# Patient Record
Sex: FEMALE | Race: WHITE | Employment: FULL TIME | URBAN - METROPOLITAN AREA
[De-identification: names, ages, dates, MRNs, and addresses within clinical notes are randomized per-mention and may not be internally consistent; named-entity substitution may affect disease eponyms.]

---

## 2018-12-17 ENCOUNTER — HOSPITAL ENCOUNTER (EMERGENCY)
Age: 33
Discharge: HOME | End: 2018-12-17
Attending: EMERGENCY MEDICINE
Payer: OTHER GOVERNMENT

## 2018-12-17 ENCOUNTER — APPOINTMENT (OUTPATIENT)
Dept: GENERAL RADIOLOGY | Age: 33
End: 2018-12-17
Attending: PHYSICIAN ASSISTANT
Payer: OTHER GOVERNMENT

## 2018-12-17 VITALS
HEART RATE: 82 BPM | HEIGHT: 69 IN | RESPIRATION RATE: 18 BRPM | SYSTOLIC BLOOD PRESSURE: 155 MMHG | WEIGHT: 158 LBS | OXYGEN SATURATION: 99 % | BODY MASS INDEX: 23.4 KG/M2 | DIASTOLIC BLOOD PRESSURE: 79 MMHG | TEMPERATURE: 98.5 F

## 2018-12-17 DIAGNOSIS — M06.9 RHEUMATOID ARTHRITIS, INVOLVING UNSPECIFIED SITE, UNSPECIFIED RHEUMATOID FACTOR PRESENCE: ICD-10-CM

## 2018-12-17 DIAGNOSIS — M54.42 ACUTE MIDLINE LOW BACK PAIN WITH LEFT-SIDED SCIATICA: ICD-10-CM

## 2018-12-17 DIAGNOSIS — M54.32 SCIATICA, LEFT SIDE: Primary | ICD-10-CM

## 2018-12-17 PROCEDURE — 99282 EMERGENCY DEPT VISIT SF MDM: CPT

## 2018-12-17 PROCEDURE — 72100 X-RAY EXAM L-S SPINE 2/3 VWS: CPT

## 2018-12-17 RX ORDER — MELOXICAM 15 MG/1
15 TABLET ORAL DAILY
Qty: 20 TAB | Refills: 0 | Status: SHIPPED | OUTPATIENT
Start: 2018-12-17 | End: 2019-09-22

## 2018-12-17 RX ORDER — PREDNISONE 20 MG/1
60 TABLET ORAL
Qty: 18 TAB | Refills: 0 | Status: SHIPPED | OUTPATIENT
Start: 2018-12-17 | End: 2018-12-23

## 2018-12-18 NOTE — ED NOTES
Patient (s)  given copy of dc instructions and 2 script(s). Patient(s)  verbalized understanding of instructions and script (s). Patient given a current medication reconciliation form and verbalized understanding of their medications. Patient (s) verbalized understanding of the importance of discussing medications with  his or her physician or clinic when they follow up. Patient alert and oriented and in no acute distress. Pt verbalizes pain scale of 0 out of 10. Patient discharged home ambulatory with family.

## 2018-12-18 NOTE — ED PROVIDER NOTES
EMERGENCY DEPARTMENT HISTORY AND PHYSICAL EXAM      Date: 12/17/2018  Patient Name: Joanna Bolden    History of Presenting Illness     Chief Complaint   Patient presents with    Back Pain       History Provided By: Patient    HPI: Joanna Bolden, 29 y.o. female with PMHx significant for rheumatoid arthritis, presents ambulatory to the ED with cc of lower back pain x 2-3 weeks which radiates into her L glute. Pt states that she was recently taken off of her biologics because she wants to have a baby next year and she has to be free of these medications for about a year before she can attempt pregnancy. Pt has been taking tylenol with no relief. She has been ambulatory. Pain worsens when she is sitting. Pt without complaint of saddle anesthesia or altered sensation when wiping in the perineal/perianal area. Pt denies any episodes of urinary/fecal incontinence. There is no complaint of blood in the urine or stool. Pt denies a ripping or tearing sensation in the abdomen. Pt denies traumatic injury to the back. Pt denies weight loss, pain worse when supine, or night sweats. Pt is able to ambulate without assistance. PCP: No primary care provider on file. There are no other complaints, changes, or physical findings at this time. Past History     Past Medical History:  No past medical history on file. Past Surgical History:  No past surgical history on file. Family History:  No family history on file. Social History:  Social History     Tobacco Use    Smoking status: Not on file   Substance Use Topics    Alcohol use: Not on file    Drug use: Not on file       Allergies:  No Known Allergies      Review of Systems   Review of Systems   Constitutional: Negative. Negative for activity change, appetite change, chills and fever. Respiratory: Negative for cough, shortness of breath and wheezing. Cardiovascular: Negative for chest pain, palpitations and leg swelling.    Gastrointestinal: Negative for diarrhea, nausea and vomiting. Genitourinary: Negative for dysuria and hematuria. Musculoskeletal: Positive for back pain. Negative for arthralgias and myalgias. Skin: Negative for color change, rash and wound. Neurological: Negative for dizziness and headaches. All other systems reviewed and are negative. Physical Exam   Physical Exam   Constitutional: She is oriented to person, place, and time. She appears well-developed and well-nourished. No distress. 29 y.o.  female in NAD  Communicates appropriately and in full sentences   HENT:   Head: Normocephalic and atraumatic. Eyes: Conjunctivae are normal. Pupils are equal, round, and reactive to light. Right eye exhibits no discharge. Left eye exhibits no discharge. Neck: Normal range of motion. Neck supple. No nuchal rigidity or meningeal signs   Pulmonary/Chest: Effort normal. No respiratory distress. Musculoskeletal: Normal range of motion. She exhibits no deformity. No neurologic, motor, vascular, or compartment embarrassment observed on exam. No focal neurologic deficits. BACK: Normal spinal curvatures. No step off or deformity. NT to palpation along midline. Positive seated SLR of L leg. Flexion/extension movement's at pt's baseline. Ambulatory without difficulty. Neurological: She is alert and oriented to person, place, and time. No focal neuro deficits. NVI. Neurologically intact of UE and LE B/L  Sensation intact and symmetrical of UE and LE B/L. Strength 5/5 of UE B/L, Strength 5/5 of LE B/L. Skin: Skin is warm and dry. No rash noted. She is not diaphoretic. No erythema. No pallor. Psychiatric: She has a normal mood and affect. Her behavior is normal.   Nursing note and vitals reviewed. Diagnostic Study Results     Radiologic Studies -   XR SPINE LUMB 2 OR 3 V   Final Result   IMPRESSION:  Normal lumbar spine.                       Medical Decision Making   I am the first provider for this patient. I reviewed the vital signs, available nursing notes, past medical history, past surgical history, family history and social history. Vital Signs-Reviewed the patient's vital signs. Patient Vitals for the past 12 hrs:   Temp Pulse Resp BP SpO2   12/17/18 2051 98.5 °F (36.9 °C) 82 18 155/79 99 %         Records Reviewed: Nursing Notes and Old Medical Records    Provider Notes (Medical Decision Making):   DDx: RA, sciatica, sprain, strain, spasm. Pt requests XR while here. Will treat symptomatically and urge close PCP and orthopedics follow-up    ED Course:   Initial assessment performed. The patients presenting problems have been discussed, and they are in agreement with the care plan formulated and outlined with them. I have encouraged them to ask questions as they arise throughout their visit. DISCHARGE NOTE:  Hector Gutierrez  results have been reviewed with her. She has been counseled regarding her diagnosis. She verbally conveys understanding and agreement of the signs, symptoms, diagnosis, treatment and prognosis and additionally agrees to follow up as recommended with Dr. Gage primary care provider on file. in 24 - 48 hours. She also agrees with the care-plan and conveys that all of her questions have been answered. I have also put together some discharge instructions for her that include: 1) educational information regarding their diagnosis, 2) how to care for their diagnosis at home, as well a 3) list of reasons why they would want to return to the ED prior to their follow-up appointment, should their condition change. She and/or family's questions have been answered. I have encouraged them to see the official results in [facility]\" or to retrieve the specifics of their results from medical records. PLAN:  1. Return precautions as discussed  2. Follow-up with providers as directed  3. Medications as prescribed    Return to ED if worse     Diagnosis     Clinical Impression:   1. Sciatica, left side    2. Rheumatoid arthritis, involving unspecified site, unspecified rheumatoid factor presence ([facility] [address]    3. Acute midline low back pain with left-sided sciatica        Discharge Medication List as of 12/17/2018 10:03 PM      START taking these medications    Details   predniSONE (DELTASONE) 20 mg tablet Take 60 mg by mouth daily (with breakfast) for 6 days. , Print, Disp-18 Tab, R-0      meloxicam (MOBIC) 15 mg tablet Take 1 Tab by mouth daily. , Print, Disp-20 Tab, R-0             Follow-up Information     Follow up With Specialties Details Why Contact Info    [facility] EMERGENCY DEPT Emergency Medicine Go to If symptoms worsen, As needed [address]    Luke Perez MD Orthopedic Surgery Call today  [address] [place] [address]  [phone]                This note will not be viewable in [address]

## 2018-12-18 NOTE — DISCHARGE INSTRUCTIONS
Learning About Relief for Back Pain  What is back tension and strain? Back strain happens when you overstretch, or pull, a muscle in your back. You may hurt your back in an accident or when you exercise or lift something. Most back pain will get better with rest and time. You can take care of yourself at home to help your back heal.  What can you do first to relieve back pain? When you first feel back pain, try these steps:  · Walk. Take a short walk (10 to 20 minutes) on a level surface (no slopes, hills, or stairs) every 2 to 3 hours. Walk only distances you can manage without pain, especially leg pain. · Relax. Find a comfortable position for rest. Some people are comfortable on the floor or a medium-firm bed with a small pillow under their head and another under their knees. Some people prefer to lie on their side with a pillow between their knees. Don't stay in one position for too long. · Try heat or ice. Try using a heating pad on a low or medium setting, or take a warm shower, for 15 to 20 minutes every 2 to 3 hours. Or you can buy single-use heat wraps that last up to 8 hours. You can also try an ice pack for 10 to 15 minutes every 2 to 3 hours. You can use an ice pack or a bag of frozen vegetables wrapped in a thin towel. There is not strong evidence that either heat or ice will help, but you can try them to see if they help. You may also want to try switching between heat and cold. · Take pain medicine exactly as directed. ¨ If the doctor gave you a prescription medicine for pain, take it as prescribed. ¨ If you are not taking a prescription pain medicine, ask your doctor if you can take an over-the-counter medicine. What else can you do? · Stretch and exercise. Exercises that increase flexibility may relieve your pain and make it easier for your muscles to keep your spine in a good, neutral position. And don't forget to keep walking. · Do self-massage.  You can use self-massage to unwind after work or school or to energize yourself in the morning. You can easily massage your feet, hands, or neck. Self-massage works best if you are in comfortable clothes and are sitting or lying in a comfortable position. Use oil or lotion to massage bare skin. · Reduce stress. Back pain can lead to a vicious [place]: Distress about the pain tenses the muscles in your back, which in turn causes more pain. Learn how to relax your mind and your muscles to lower your stress. Where can you learn more? Go to http://hanna-royce.info/. Enter A747 in the search box to learn more about \"Learning About Relief for Back Pain. \"  Current as of: March 21, 2017  Content Version: 11.5  © 4305-1865 [employer]. Care instructions adapted under license by [employer] (which disclaims liability or warranty for this information). If you have questions about a medical condition or this instruction, always ask your healthcare professional. [place] [address] any warranty or liability for your use of this information. Back Pain: Care Instructions  Your Care Instructions    Back pain has many possible causes. It is often related to problems with muscles and ligaments of the back. It may also be related to problems with the nerves, discs, or bones of the back. Moving, lifting, standing, sitting, or sleeping in an awkward way can strain the back. Sometimes you don't notice the injury until later. Arthritis is another common cause of back pain. Although it may hurt a lot, back pain usually improves on its own within several weeks. Most people recover in 12 weeks or less. Using good home treatment and being careful not to stress your back can help you feel better sooner. Follow-up care is a key part of your treatment and safety. Be sure to make and go to all appointments, and call your doctor if you are having problems.  It's also a good idea to know your test results and keep a list of the medicines you take. How can you care for yourself at home? · Sit or lie in positions that are most comfortable and reduce your pain. Try one of these positions when you lie down:  ? Lie on your back with your knees bent and supported by large pillows. ? Lie on the floor with your legs on the seat of a sofa or chair. ? Lie on your side with your knees and hips bent and a pillow between your legs. ? Lie on your stomach if it does not make pain worse. · Do not sit up in bed, and avoid soft couches and twisted positions. Bed rest can help relieve pain at first, but it delays healing. Avoid bed rest after the first day of back pain. · Change positions every 30 minutes. If you must sit for long periods of time, take breaks from sitting. Get up and walk around, or lie in a comfortable position. · Try using a heating pad on a low or medium setting for 15 to 20 minutes every 2 or 3 hours. Try a warm shower in place of one session with the heating pad. · You can also try an ice pack for 10 to 15 minutes every 2 to 3 hours. Put a thin cloth between the ice pack and your skin. · Take pain medicines exactly as directed. ? If the doctor gave you a prescription medicine for pain, take it as prescribed. ? If you are not taking a prescription pain medicine, ask your doctor if you can take an over-the-counter medicine. · Take short walks several times a day. You can start with 5 to 10 minutes, 3 or 4 times a day, and work up to longer walks. Walk on level surfaces and avoid hills and stairs until your back is better. · Return to work and other activities as soon as you can. Continued rest without activity is usually not good for your back. · To prevent future back pain, do exercises to stretch and strengthen your back and stomach. Learn how to use good posture, safe lifting techniques, and proper body mechanics. When should you call for help?   Call your doctor now or seek immediate medical care if:    · You have new or worsening numbness in your legs.     · You have new or worsening weakness in your legs. (This could make it hard to stand up.)     · You lose control of your bladder or bowels.    Watch closely for changes in your health, and be sure to contact your doctor if:    · You have a fever, lose weight, or don't feel well.     · You do not get better as expected. Where can you learn more? Go to http://hanna-royce.info/. Enter T334 in the search box to learn more about \"Back Pain: Care Instructions. \"  Current as of: November 29, 2017  Content Version: 11.8  © 2529-5281 [email or web address]. Care instructions adapted under license by [employer] (which disclaims liability or warranty for this information). If you have questions about a medical condition or this instruction, always ask your healthcare professional. [place] [address] any warranty or liability for your use of this information. Learning About Relief for Back Pain  What is back tension and strain? Back strain happens when you overstretch, or pull, a muscle in your back. You may hurt your back in an accident or when you exercise or lift something. Most back pain will get better with rest and time. You can take care of yourself at home to help your back heal.  What can you do first to relieve back pain? When you first feel back pain, try these steps:  · Walk. Take a short walk (10 to 20 minutes) on a level surface (no slopes, hills, or stairs) every 2 to 3 hours. Walk only distances you can manage without pain, especially leg pain. · Relax. Find a comfortable position for rest. Some people are comfortable on the floor or a medium-firm bed with a small pillow under their head and another under their knees. Some people prefer to lie on their side with a pillow between their knees. Don't stay in one position for too long. · Try heat or ice.  Try using a heating pad on a low or medium setting, or take a warm shower, for 15 to 20 minutes every 2 to 3 hours. Or you can buy single-use heat wraps that last up to 8 hours. You can also try an ice pack for 10 to 15 minutes every 2 to 3 hours. You can use an ice pack or a bag of frozen vegetables wrapped in a thin towel. There is not strong evidence that either heat or ice will help, but you can try them to see if they help. You may also want to try switching between heat and cold. · Take pain medicine exactly as directed. ? If the doctor gave you a prescription medicine for pain, take it as prescribed. ? If you are not taking a prescription pain medicine, ask your doctor if you can take an over-the-counter medicine. What else can you do? · Stretch and exercise. Exercises that increase flexibility may relieve your pain and make it easier for your muscles to keep your spine in a good, neutral position. And don't forget to keep walking. · Do self-massage. You can use self-massage to unwind after work or school or to energize yourself in the morning. You can easily massage your feet, hands, or neck. Self-massage works best if you are in comfortable clothes and are sitting or lying in a comfortable position. Use oil or lotion to massage bare skin. · Reduce stress. Back pain can lead to a vicious [place]: Distress about the pain tenses the muscles in your back, which in turn causes more pain. Learn how to relax your mind and your muscles to lower your stress. Where can you learn more? Go to http://hanna-royce.info/. Enter P273 in the search box to learn more about \"Learning About Relief for Back Pain. \"  Current as of: November 29, 2017  Content Version: 11.8  © 9171-7134 [employer]. Care instructions adapted under license by [employer] (which disclaims liability or warranty for this information).  If you have questions about a medical condition or this instruction, always ask your healthcare professional. [address] any warranty or liability for your use of this information. Sciatica: Exercises  Your Care Instructions  Here are some examples of typical rehabilitation exercises for your condition. Start each exercise slowly. Ease off the exercise if you start to have pain. Your doctor or physical therapist will tell you when you can start these exercises and which ones will work best for you. When you are not being active, find a comfortable position for rest. Some people are comfortable on the floor or a medium-firm bed with a small pillow under their head and another under their knees. Some people prefer to lie on their side with a pillow between their knees. Don't stay in one position for too long. Take short walks (10 to 20 minutes) every 2 to 3 hours. Avoid slopes, hills, and stairs until you feel better. Walk only distances you can manage without pain, especially leg pain. How to do the exercises  Back stretches    1. Get down on your hands and knees on the floor. 2. Relax your head and allow it to droop. Round your back up toward the ceiling until you feel a nice stretch in your upper, middle, and lower back. Hold this stretch for as long as it feels comfortable, or about 15 to 30 seconds. 3. Return to the starting position with a flat back while you are on your hands and knees. 4. Let your back sway by pressing your stomach toward the floor. Lift your buttocks toward the ceiling. 5. Hold this position for 15 to 30 seconds. 6. Repeat 2 to 4 times. Follow-up care is a key part of your treatment and safety. Be sure to make and go to all appointments, and call your doctor if you are having problems. It's also a good idea to know your test results and keep a list of the medicines you take. Where can you learn more? Go to http://hanna-royce.info/. Enter H675 in the search box to learn more about \"Sciatica: Exercises. \"  Current as of: November 29, 2017  Content Version: 11.8  © [id number] [employer]. Care instructions adapted under license by [employer] (which disclaims liability or warranty for this information). If you have questions about a medical condition or this instruction, always ask your healthcare professional. [address] any warranty or liability for your use of this information. Sciatica: Care Instructions  Your Care Instructions    Sciatica (say \"olz-UN-qv-kuh\") is an irritation of one of the sciatic nerves, which come from the spinal cord in the lower back. The sciatic nerves and their branches extend down through the buttock to the foot. Sciatica can develop when an injured disc in the back presses against a spinal nerve root. Its main symptom is pain, numbness, or weakness that is often worse in the leg or foot than in the back. Sciatica often will improve and go away with time. Early treatment usually includes medicines and exercises to relieve pain. Follow-up care is a key part of your treatment and safety. Be sure to make and go to all appointments, and call your doctor if you are having problems. It's also a good idea to know your test results and keep a list of the medicines you take. How can you care for yourself at home? · Take pain medicines exactly as directed. ? If the doctor gave you a prescription medicine for pain, take it as prescribed. ? If you are not taking a prescription pain medicine, ask your doctor if you can take an over-the-counter medicine. · Use heat or ice to relieve pain. ? To apply heat, put a warm water bottle, heating pad set on low, or warm cloth on your back. Do not go to sleep with a heating pad on your skin. ? To use ice, put ice or a cold pack on the area for 10 to 20 minutes at a time. Put a thin cloth between the ice and your skin. · Avoid sitting if possible, unless it feels better than standing.   · Alternate lying down with short walks. Increase your walking distance as you are able to without making your symptoms worse. · Do not do anything that makes your symptoms worse. When should you call for help? Call 911 anytime you think you may need emergency care. For example, call if:    · You are unable to move a leg at all.   [facility] your doctor now or seek immediate medical care if:    · You have new or worse symptoms in your legs or buttocks. Symptoms may include:  ? Numbness or tingling. ? Weakness. ? Pain.     · You lose bladder or bowel control.    Watch closely for changes in your health, and be sure to contact your doctor if:    · You are not getting better as expected. Where can you learn more? Go to http://hanna-royce.info/. Enter [phone] in the search box to learn more about \"Sciatica: Care Instructions. \"  Current as of: November 29, 2017  Content Version: 11.8  © 0124-4153 [employer]. Care instructions adapted under license by [employer] (which disclaims liability or warranty for this information). If you have questions about a medical condition or this instruction, always ask your healthcare professional. [place] [address] any warranty or liability for your use of this information.

## 2019-09-22 ENCOUNTER — HOSPITAL ENCOUNTER (EMERGENCY)
Age: 34
Discharge: HOME | End: 2019-09-22
Attending: EMERGENCY MEDICINE
Payer: OTHER GOVERNMENT

## 2019-09-22 VITALS
BODY MASS INDEX: 23.48 KG/M2 | DIASTOLIC BLOOD PRESSURE: 66 MMHG | RESPIRATION RATE: 18 BRPM | OXYGEN SATURATION: 98 % | HEART RATE: 64 BPM | WEIGHT: 159 LBS | TEMPERATURE: 97.6 F | SYSTOLIC BLOOD PRESSURE: 99 MMHG

## 2019-09-22 DIAGNOSIS — L03.032 PARONYCHIA OF FOURTH TOE OF LEFT FOOT: Primary | ICD-10-CM

## 2019-09-22 PROCEDURE — 74011250637 HC RX REV CODE- 250/637: Performed by: EMERGENCY MEDICINE

## 2019-09-22 PROCEDURE — 99283 EMERGENCY DEPT VISIT LOW MDM: CPT

## 2019-09-22 RX ORDER — SULFAMETHOXAZOLE AND TRIMETHOPRIM 800; 160 MG/1; MG/1
1 TABLET ORAL 2 TIMES DAILY
Qty: 14 TAB | Refills: 0 | Status: SHIPPED | OUTPATIENT
Start: 2019-09-22 | End: 2019-09-29

## 2019-09-22 RX ORDER — CEPHALEXIN 250 MG/1
500 CAPSULE ORAL
Status: COMPLETED | OUTPATIENT
Start: 2019-09-22 | End: 2019-09-22

## 2019-09-22 RX ORDER — SULFAMETHOXAZOLE AND TRIMETHOPRIM 800; 160 MG/1; MG/1
1 TABLET ORAL
Status: COMPLETED | OUTPATIENT
Start: 2019-09-22 | End: 2019-09-22

## 2019-09-22 RX ORDER — CEPHALEXIN 500 MG/1
500 CAPSULE ORAL 4 TIMES DAILY
Qty: 28 CAP | Refills: 0 | Status: SHIPPED | OUTPATIENT
Start: 2019-09-22 | End: 2019-09-29

## 2019-09-22 RX ADMIN — SULFAMETHOXAZOLE AND TRIMETHOPRIM 1 TABLET: 800; 160 TABLET ORAL at 04:02

## 2019-09-22 RX ADMIN — CEPHALEXIN 500 MG: 250 CAPSULE ORAL at 04:02

## 2019-09-22 NOTE — ED PROVIDER NOTES
EMERGENCY DEPARTMENT HISTORY AND PHYSICAL EXAM      Date: 9/22/2019  Patient Name: Chung Mcdowell    History of Presenting Illness     Chief Complaint   Patient presents with    Toe Pain     History Provided By: Patient    HPI: Mojgan Nguyen, 36 y.o. female with for rheumatoid arthritis who presents via private vehicle to the ED with cc of left fourth toe pain and swelling for the past 5 days. Patient states she got a pedicure 2 weeks ago and a proximally week later started to have pain and swelling of the left distal 4th toe. She has been taking ibuprofen and Tylenol with no relief of her symptoms. She denies any trauma or fevers. She did try to take her toenail clippers and tremor toe nose and she also tried to take a safety pin and poke the swollen area with no improvement of symptoms. PMHx: Rheumatoid arthritis  Social Hx: Social alcohol use, denies illegal drug use, denies tobacco use    PCP: None    There are no other complaints, changes, or physical findings at this time. No current facility-administered medications on file prior to encounter. No current outpatient medications on file prior to encounter. Past History     Past Medical History:  Past Medical History:   Diagnosis Date    Arthritis      Past Surgical History:  History reviewed. No pertinent surgical history. Family History:  History reviewed. No pertinent family history. Social History:  Social History     Tobacco Use    Smoking status: Never Smoker    Smokeless tobacco: Never Used   Substance Use Topics    Alcohol use: Yes     Comment: 3-4 drinks week    Drug use: Not on file     Allergies:  No Known Allergies  Review of Systems   Review of Systems   Constitutional: Negative for chills and fever. HENT: Negative for congestion, rhinorrhea, sneezing and sore throat. Eyes: Negative for redness and visual disturbance. Respiratory: Negative for shortness of breath. Cardiovascular: Negative for leg swelling. Gastrointestinal: Negative for abdominal pain, nausea and vomiting. Genitourinary: Negative for difficulty urinating and frequency. Musculoskeletal: Negative for back pain, myalgias and neck stiffness. Skin: Positive for wound. Negative for rash. Neurological: Negative for dizziness, syncope, weakness and headaches. Hematological: Negative for adenopathy. All other systems reviewed and are negative. Physical Exam   Physical Exam   Constitutional: She is oriented to person, place, and time. She appears well-developed and well-nourished. HENT:   Head: Normocephalic and atraumatic. Mouth/Throat: Oropharynx is clear and moist.   Eyes: Conjunctivae and EOM are normal.   Neck: Normal range of motion and full passive range of motion without pain. Neck supple. Cardiovascular: Normal rate, regular rhythm, S1 normal, S2 normal, normal heart sounds, intact distal pulses and normal pulses. No murmur heard. Pulmonary/Chest: Effort normal and breath sounds normal. No respiratory distress. She has no wheezes. Abdominal: Soft. Normal appearance and bowel sounds are normal. She exhibits no distension. There is no tenderness. There is no rebound. Musculoskeletal: Normal range of motion. Neurological: She is alert and oriented to person, place, and time. She has normal strength. Skin: Skin is warm, dry and intact. No rash noted. Mild swelling and erythema of the left distal fourth toe, no fluctuance or palpable fluid collection   Psychiatric: She has a normal mood and affect. Her speech is normal and behavior is normal. Judgment and thought content normal.   Nursing note and vitals reviewed. Diagnostic Study Results   Labs -   No results found for this or any previous visit (from the past 12 hour(s)). Radiologic Studies -   No orders to display     No results found. Medical Decision Making   I am the first provider for this patient.     I reviewed the vital signs, available nursing notes, past medical history, past surgical history, family history and social history. Vital Signs-Reviewed the patient's vital signs. Patient Vitals for the past 12 hrs:   Temp Pulse Resp BP SpO2   09/22/19 0310 97.6 °F (36.4 °C) 64 18 99/66 98 %     Pulse Oximetry Analysis - 98% on RA    Records Reviewed: Nursing Notes    Provider Notes (Medical Decision Making):   78-year-old female presents with left toe pain for the past 5 days as well as swelling and erythema. She is on an immunomodulators for her rheumatoid arthritis and her physical exam is concerning for paronychia. I do not feel any fluid collection that is able to be drained at this time. We will start her on antibiotics and give her return precautions. Discussed plan of care with patient who agrees. ED Course:   Initial assessment performed. The patients presenting problems have been discussed, and they are in agreement with the care plan formulated and outlined with them. I have encouraged them to ask questions as they arise throughout their visit. Progress Note:   Updated pt on all returned results and findings. Discussed the importance of proper follow up as referred below along with return precautions. Pt in agreement with the care plan and expresses agreement with and understanding of all items discussed. Disposition:  Discharge Note:  The pt is ready for discharge. The pt's signs, symptoms, diagnosis, and discharge instructions have been discussed and pt has conveyed their understanding. The pt is to follow up as recommended or return to ER should their symptoms worsen. Plan has been discussed and pt is in agreement. PLAN:  1. Current Discharge Medication List      START taking these medications    Details   trimethoprim-sulfamethoxazole (BACTRIM DS) 160-800 mg per tablet Take 1 Tab by mouth two (2) times a day for 7 days.   Qty: 14 Tab, Refills: 0      cephALEXin (KEFLEX) 500 mg capsule Take 1 Cap by mouth four (4) times daily for 7 days.  Qty: 28 Cap, Refills: 0           2. Follow-up Information     Follow up With Specialties Details Why Contact Info    Tresa Linares MD Rheumatology Schedule an appointment as soon as possible for a visit  Arthur Burgess [phone]      [facility] EMERGENCY DEPT Emergency Medicine  As needed, If symptoms worsen Agustin Harrison  [phone]        Return to ED if worse     Diagnosis     Clinical Impression:   1. Paronychia of fourth toe of left foot            Please note that this dictation was completed with Dragon, computer voice recognition software. Quite often unanticipated grammatical, syntax, homophones, and other interpretive errors are inadvertently transcribed by the computer software. Please disregard these errors. Additionally, please excuse any errors that have escaped final proofreading.

## 2019-09-22 NOTE — DISCHARGE INSTRUCTIONS
Patient Education        Paronychia: Care Instructions  Your Care Instructions  Paronychia (say \"nosz-kj-GO-davi-uh\") is an infection of the skin around a fingernail or toenail. It happens when germs enter through a break in the skin. The doctor may have made a small cut in the infected area to drain the pus. Most cases of paronychia improve in a few days. But watch your symptoms and follow your doctor's advice. Though rare, a mild case can turn into something more serious and infect your entire finger or toe. Also, it is possible for an infection to return. Follow-up care is a key part of your treatment and safety. Be sure to make and go to all appointments, and call your doctor if you are having problems. It's also a good idea to know your test results and keep a list of the medicines you take. How can you care for yourself at home? · If your doctor told you how to care for your infected nail, follow the doctor's instructions. If you did not get instructions, follow this general advice:  ? Wash the area with clean water 2 times a day. Don't use hydrogen peroxide or alcohol, which can slow healing. ? You may cover the area with a thin layer of petroleum jelly, such as Vaseline, and a nonstick bandage. ? Apply more petroleum jelly and replace the bandage as needed. · If your doctor prescribed antibiotics, take them as directed. Do not stop taking them just because you feel better. You need to take the full course of antibiotics. · Take an over-the-counter pain medicine, such as acetaminophen (Tylenol), ibuprofen (Advil, Motrin), or naproxen (Aleve). Read and follow all instructions on the label. · Do not take two or more pain medicines at the same time unless the doctor told you to. Many pain medicines have acetaminophen, which is Tylenol. Too much acetaminophen (Tylenol) can be harmful. · Prop up the toe or finger so that it is higher than the level of your heart.  This will help with pain and swelling. · Apply heat. Put a warm water bottle, heating pad set on low, or warm cloth on your finger or toe. Do not go to sleep with a heating pad on your skin. · Soak the area in warm water twice a day for 15 minutes each time. After soaking, dry the area well and apply a thin layer of petroleum jelly, such as Vaseline. Put on a new bandage. When should you call for help? Call your doctor now or seek immediate medical care if:    · You have signs of new or worsening infection, such as:  ? Increased pain, swelling, warmth, or redness. ? Red streaks leading from the infected skin. ? Pus draining from the area. ? A fever.    Watch closely for changes in your health, and be sure to contact your doctor if:    · You do not get better as expected. Where can you learn more? Go to http://hanna-royce.info/. Enter C435 in the search box to learn more about \"Paronychia: Care Instructions. \"  Current as of: April 1, 2019  Content Version: 12.2  © 6797-0484 [employer]. Care instructions adapted under license by [employer] (which disclaims liability or warranty for this information). If you have questions about a medical condition or this instruction, always ask your healthcare professional. [address] any warranty or liability for your use of this information. Patient Education        Cellulitis: Care Instructions  Your Care Instructions    Cellulitis is a skin infection caused by bacteria, most often strep or staph. It often occurs after a break in the skin from a scrape, cut, bite, or puncture, or after a rash. Cellulitis may be treated without doing tests to find out what caused it. But your doctor may do tests, if needed, to look for a specific bacteria, like methicillin-resistant Staphylococcus aureus (MRSA). The doctor has checked you carefully, but problems can develop later.  If you notice any problems or new symptoms, get medical treatment right away. Follow-up care is a key part of your treatment and safety. Be sure to make and go to all appointments, and call your doctor if you are having problems. It's also a good idea to know your test results and keep a list of the medicines you take. How can you care for yourself at home? · Take your antibiotics as directed. Do not stop taking them just because you feel better. You need to take the full course of antibiotics. · Prop up the infected area on pillows to reduce pain and swelling. Try to keep the area above the level of your heart as often as you can. · If your doctor told you how to care for your wound, follow your doctor's instructions. If you did not get instructions, follow this general advice:  ? Wash the wound with clean water 2 times a day. Don't use hydrogen peroxide or alcohol, which can slow healing. ? You may cover the wound with a thin layer of petroleum jelly, such as Vaseline, and a nonstick bandage. ? Apply more petroleum jelly and replace the bandage as needed. · Be safe with medicines. Take pain medicines exactly as directed. ? If the doctor gave you a prescription medicine for pain, take it as prescribed. ? If you are not taking a prescription pain medicine, ask your doctor if you can take an over-the-counter medicine. To prevent cellulitis in the future  · Try to prevent cuts, scrapes, or other injuries to your skin. Cellulitis most often occurs where there is a break in the skin. · If you get a scrape, cut, mild burn, or bite, wash the wound with clean water as soon as you can to help avoid infection. Don't use hydrogen peroxide or alcohol, which can slow healing. · If you have swelling in your legs (edema), support stockings and good skin care may help prevent leg sores and cellulitis. · Take care of your feet, especially if you have diabetes or other conditions that increase the risk of infection. Wear shoes and socks. Do not go barefoot.  If you have athlete's foot or other skin problems on your feet, talk to your doctor about how to treat them. When should you call for help? Call your doctor now or seek immediate medical care if:    · You have signs that your infection is getting worse, such as:  ? Increased pain, swelling, warmth, or redness. ? Red streaks leading from the area. ? Pus draining from the area. ? A fever.     · You get a rash.    Watch closely for changes in your health, and be sure to contact your doctor if:    · You do not get better as expected. Where can you learn more? Go to http://hanna-royce.info/. [address] in the search box to learn more about \"Cellulitis: Care Instructions. \"  Current as of: April 1, 2019  Content Version: 12.2  © 0165-0783 [employer]. Care instructions adapted under license by [employer] (which disclaims liability or warranty for this information). If you have questions about a medical condition or this instruction, always ask your healthcare professional. [address] any warranty or liability for your use of this information.

## 2019-09-22 NOTE — ED NOTES
Pt presents ambulatory to ED complaining of  L 4th toes swelling, pain, and redness x 5 day. Pt reports getting a pedicure 2 weeks ago. Pt says she has Pt is alert and oriented x 4, RR even and unlabored, skin is warm and dry but red to L 4th toe. Assesment completed and pt updated on plan of care. Emergency Department Nursing Plan of Care       The Nursing Plan of Care is developed from the Nursing assessment and Emergency Department Attending provider initial evaluation. The plan of care may be reviewed in the ED Provider note.     The Plan of Care was developed with the following considerations:   Patient / Family readiness to learn indicated by:verbalized understanding  Persons(s) to be included in education: patient  Barriers to Learning/Limitations:No    Signed     [address], RN    9/22/2019   3:34 AM

## 2019-09-22 NOTE — ED NOTES
Discharge instructions were given to the patient by Hermilo Gonsalves RN. The patient left the Emergency Department ambulatory, alert and oriented and in no acute distress with 2 prescriptions. The patient was encouraged to call or return to the ED for worsening issues or problems and was encouraged to schedule a follow up appointment for continuing care. The patient verbalized understanding of discharge instructions and prescriptions, all questions were answered. The patient has no further concerns at this time.

## 2019-12-02 ENCOUNTER — HOSPITAL ENCOUNTER (OUTPATIENT)
Dept: PERINATAL CARE | Age: 34
Discharge: HOME | End: 2019-12-02
Attending: OBSTETRICS & GYNECOLOGY

## 2020-01-22 ENCOUNTER — HOSPITAL ENCOUNTER (OUTPATIENT)
Dept: PERINATAL CARE | Age: 35
Discharge: HOME | End: 2020-01-22
Attending: OBSTETRICS & GYNECOLOGY
Payer: OTHER GOVERNMENT

## 2020-01-22 PROCEDURE — 76805 OB US >/= 14 WKS SNGL FETUS: CPT | Performed by: OBSTETRICS & GYNECOLOGY

## 2020-02-10 ENCOUNTER — HOSPITAL ENCOUNTER (OUTPATIENT)
Dept: PERINATAL CARE | Age: 35
Discharge: HOME | End: 2020-02-10
Attending: OBSTETRICS & GYNECOLOGY
Payer: OTHER GOVERNMENT

## 2020-02-10 PROCEDURE — 76816 OB US FOLLOW-UP PER FETUS: CPT | Performed by: OBSTETRICS & GYNECOLOGY

## 2020-04-28 ENCOUNTER — HOSPITAL ENCOUNTER (OUTPATIENT)
Dept: PERINATAL CARE | Age: 35
Discharge: HOME | End: 2020-04-28
Attending: OBSTETRICS & GYNECOLOGY
Payer: OTHER GOVERNMENT

## 2020-04-28 PROCEDURE — 76816 OB US FOLLOW-UP PER FETUS: CPT | Performed by: OBSTETRICS & GYNECOLOGY

## 2021-02-24 ENCOUNTER — OFFICE VISIT (OUTPATIENT)
Dept: SURGERY | Age: 36
End: 2021-02-24
Payer: OTHER GOVERNMENT

## 2021-02-24 VITALS
BODY MASS INDEX: 23.55 KG/M2 | HEART RATE: 62 BPM | TEMPERATURE: 98.2 F | HEIGHT: 69 IN | WEIGHT: 159 LBS | DIASTOLIC BLOOD PRESSURE: 53 MMHG | SYSTOLIC BLOOD PRESSURE: 112 MMHG

## 2021-02-24 DIAGNOSIS — N61.0 MASTITIS: Primary | ICD-10-CM

## 2021-02-24 PROCEDURE — 76642 ULTRASOUND BREAST LIMITED: CPT | Performed by: SURGERY

## 2021-02-24 PROCEDURE — 99243 OFF/OP CNSLTJ NEW/EST LOW 30: CPT | Performed by: SURGERY

## 2021-02-24 RX ORDER — CEPHALEXIN 500 MG/1
500 CAPSULE ORAL
COMMUNITY
Start: 2021-02-10 | End: 2021-02-24

## 2021-02-24 RX ORDER — AMOXICILLIN AND CLAVULANATE POTASSIUM 875; 125 MG/1; MG/1
1 TABLET, FILM COATED ORAL 2 TIMES DAILY
Qty: 28 TAB | Refills: 1 | Status: SHIPPED | OUTPATIENT
Start: 2021-02-24

## 2021-02-24 NOTE — PROGRESS NOTES
HISTORY OF PRESENT ILLNESS Kathrine Marroquin is a 30 y.o. female. HPI NEW patient referral for consultation by Dr. Carie Mcgrath for RIGHT breast mastitis/abscess. She has a 7 month old and is currently nursing him. Suddenly last week the patient started with RIGHT breast pain fever and chills. The patient was admitted to [facility] in [place] for 3 days of IV antibiotics. She was discharged on po Keflex and only has 2 days left to take. When the patient got home from the hospital she said her breast began to drain a large amount of a purulent drainage. The RIGHT breast is  and she is unable to produce any milk from that breast. Her baby refuses to latch on. Family history - Paternal aunt had breast cancer. Maternal grandmother  from ovarian cancer. Maternal uncle recently diagnosed with lymphoma. Ultrasound done at [facility] when patient was recently admitted. Review of Systems Constitutional: Positive for weight loss. HENT: Negative. Eyes: Negative. Respiratory: Negative. Cardiovascular: Positive for palpitations. Gastrointestinal: Negative. Genitourinary: Negative. Musculoskeletal: Negative. Skin: Negative. Neurological: Negative. Endo/Heme/Allergies: Negative. Psychiatric/Behavioral: Negative. Physical Exam 
 
ASSESSMENT and PLAN 
{ASSESSMENT/PLAN:64349}

## 2021-02-24 NOTE — PROGRESS NOTES
HISTORY OF PRESENT ILLNESS  Nelly Zhang is a 30 y.o. female. HPI  NEW patient referral for consultation by Dr. Andrez Garcias for RIGHT breast mastitis/abscess. She has a 7 month old and is currently nursing him. Suddenly last week the patient started with RIGHT breast pain fever and chills. The patient was admitted to [facility] in [place] for 3 days of IV antibiotics. She was discharged on po Keflex and only has 2 days left to take. When the patient got home from the hospital she said her breast began to drain a large amount of a purulent drainage. The RIGHT breast is  and she is unable to produce any milk from that breast. Her baby refuses to latch on. Family history - Paternal aunt had breast cancer. Maternal grandmother  from ovarian cancer. Maternal uncle recently diagnosed with lymphoma. Ultrasound done at [facility] when patient was recently admitted.        Past Medical History:   Diagnosis Date    Arthritis        History reviewed. No pertinent surgical history.     Social History     Socioeconomic History    Marital status: SINGLE     Spouse name: Not on file    Number of children: Not on file    Years of education: Not on file    Highest education level: Not on file   Occupational History    Not on file   Social Needs    Financial resource strain: Not on file    Food insecurity     Worry: Not on file     Inability: Not on file    Transportation needs     Medical: Not on file     Non-medical: Not on file   Tobacco Use    Smoking status: Never Smoker    Smokeless tobacco: Never Used   Substance and Sexual Activity    Alcohol use: Yes     Comment: 3-4 drinks week    Drug use: Not on file    Sexual activity: Not on file   Lifestyle    Physical activity     Days per week: Not on file     Minutes per session: Not on file    Stress: Not on file   Relationships    Social connections     Talks on phone: Not on file     Gets together: Not on file     Attends Voodoo service: Not on file     Active member of club or organization: Not on file     Attends meetings of clubs or organizations: Not on file     Relationship status: Not on file    Intimate partner violence     Fear of current or ex partner: Not on file     Emotionally abused: Not on file     Physically abused: Not on file     Forced sexual activity: Not on file   Other Topics Concern    Not on file   Social History Narrative    Not on file       Current Outpatient Medications on File Prior to Visit   Medication Sig Dispense Refill    cephALEXin (KEFLEX) 500 mg capsule Take 500 mg by mouth.  certolizumab pegoL (CIMZIA) 400 mg (200 mg x 2 vials) injection 400 mg by SubCUTAneous route. No current facility-administered medications on file prior to visit. No Known Allergies    OB History        1    Para        Term                AB        Living           SAB        TAB        Ectopic        Molar        Multiple        Live Births              Obstetric Comments   Menarche 13, LMP 2020, # of children 1, age of 4st delivery 30, Hysterectomy/oophorectomy no/no, Breast bx none, history of breast feeding yes, BCP none, Hormone therapy none             ROS  Constitutional: Positive for weight loss. HENT: Negative. Eyes: Negative. Respiratory: Negative. Cardiovascular: Positive for palpitations. Gastrointestinal: Negative. Genitourinary: Negative. Musculoskeletal: Negative. Skin: Negative. Neurological: Negative. Endo/Heme/Allergies: Negative. Psychiatric/Behavioral: Negative. Physical Exam  Exam conducted with a chaperone present. Cardiovascular:      Rate and Rhythm: Normal rate and regular rhythm. Heart sounds: Normal heart sounds. Pulmonary:      Breath sounds: Normal breath sounds.    Chest:      Breasts: Breasts are symmetrical.         Right: Normal. No swelling, bleeding, inverted nipple, mass, nipple discharge, skin change or tenderness. Left: Normal. No swelling, bleeding, inverted nipple, mass, nipple discharge, skin change or tenderness. Lymphadenopathy:      Cervical:      Right cervical: No superficial, deep or posterior cervical adenopathy. Left cervical: No superficial, deep or posterior cervical adenopathy. Upper Body:      Right upper body: No supraclavicular or axillary adenopathy. Left upper body: No supraclavicular or axillary adenopathy. BREAST ULTRASOUND  Indication: RIGHT breast mastitis  Technique: The area was scanned using a high-frequency linear-array near-field transducer  Findings: Normal lactational breast tissue, no fluid collection, no abscess  Impression: Normal lactational  breast tissue  Disposition: No worrisome finding on ultrasound    ASSESSMENT and PLAN    ICD-10-CM ICD-9-CM    1. Mastitis  N61.0 611.0       New patient presents for evaluation of RIGHT breast mastitis/abscess, and is doing well overall. Thickening on exam at lateral RIGHT breast. RIGHT breast US visualizes normal lactational breast tissue, no fluid collection, no abscess. Lump pt previously felt is gone and pain is resolved. Pt is able to pump more milk now. Pt advised to finish Keflex. Prescription sent for Augmentin, and pt should fill this if symptoms recur, and call for follow-up here. F/U PRN. This plan was reviewed with the patient and patient agrees. All questions were answered. Total time spent was 40 minutes.     Written by Ricky Anna, as dictated by Dr. Rajesh Rios MD.

## 2021-02-24 NOTE — PATIENT INSTRUCTIONS
Mastitis: Care Instructions Your Care Instructions Mastitis is an inflammation of the breast. It occurs most often in women who are breastfeeding, but it can affect any woman. Mastitis can be caused by poor milk flow from the breast. When milk builds up in a breast, it leaks into the nearby breast tissue. Infection can also develop when the nipples become cracked or irritated. The tissue can then become infected with bacteria. Antibiotics can usually cure mastitis. For women who are nursing, continued breastfeeding (or pumping) can help. If mastitis is not treated, a pocket of pus may form in the breast and need to be drained. Follow-up care is a key part of your treatment and safety. Be sure to make and go to all appointments, and call your doctor if you are having problems. It's also a good idea to know your test results and keep a list of the medicines you take. How can you care for yourself at home? · If your doctor prescribed antibiotics, take them as directed. Do not stop taking them just because you feel better. You need to take the full course of antibiotics. · If you are breastfeeding, continue breastfeeding or pumping breast milk. It is important to empty your breasts regularly, every 2 to 3 hours while you are awake. These tips may help: ? Before breastfeeding, place a warm, wet washcloth over your breast for about 15 minutes. Try this at least 3 times a day. This increases milk flow in the breast. Massaging the affected breast may also increase milk flow. ? Breastfeed on both sides. Try to start with your healthy breast first. Then, after your milk is flowing, breastfeed from the affected breast until it feels soft. You should empty this breast completely. Then switch back to the healthy breast and breastfeed until your baby has finished. ? Pump or hand-express a small amount of breast milk before breastfeeding if your breasts are too full with milk. This will make your breasts less full and may make it easier for your baby to latch on to your breast. 
? Pump or express milk from the affected breast if it hurts too much to breastfeed. · Take an over-the-counter pain medicine, such as acetaminophen (Tylenol) or ibuprofen (Advil, Motrin) to relieve pain and fever. Read and follow all instructions on the label. · Do not take two or more pain medicines at the same time unless the doctor told you to. Many pain medicines have acetaminophen, which is Tylenol. Too much acetaminophen (Tylenol) can be harmful. · Rest as much as possible. · Drink extra fluids. · If pus is draining from your infected breast, wash the nipple gently and let it air-dry before you put your bra back on. A disposable breast pad placed in the bra cup may absorb the pus. · Sometimes, a blocked nipple pore, called a milk blister (or bleb) happens. This causes milk to back up in the breast. It can cause mastitis, so it's important to treat this if it happens. A milk blister is often a white dot on your nipple that can be painful. If a milk blister is causing you pain, it may help to place a warm, wet washcloth over the blister before breastfeeding or pumping. If this doesn't clear the blockage, your doctor can open the blister using a sterile needle. When should you call for help? Call your doctor now or seek immediate medical care if: 
  · You have worse symptoms of a breast infection, such as: 
? Increased pain, swelling, redness, or warmth around a breast. 
? Red streaks leading from a breast. 
? Pus draining from a breast. 
? A fever. Watch closely for changes in your health, and be sure to contact your doctor if: 
  · You do not get better as expected. Where can you learn more? Go to http://www.[employer].com/ Enter Y207 in the search box to learn more about \"Mastitis: Care Instructions. \" Current as of: February 11, 2020               Content Version: 12.6 © 4997-6666 [employer]. Care instructions adapted under license by [employer] (which disclaims liability or warranty for this information). If you have questions about a medical condition or this instruction, always ask your healthcare professional. [place] [address] any warranty or liability for your use of this information.

## 2021-02-24 NOTE — LETTER
2/24/2021 1:16 PM 
 
Patient:  Dimas Bahena YOB: 1985 Date of Visit: 2/24/2021 Dear Manolo Damon: Thank you for referring Ms. Renetta Mcdowell to me for evaluation/treatment. Below are the relevant portions of my assessment and plan of care. If you have questions, please do not hesitate to call me. I look forward to following Ms. Mervat Astudillo along with you.  
 
 
 
Sincerely, 
 
 
Marilyn Antonio MD